# Patient Record
Sex: MALE | Race: WHITE
[De-identification: names, ages, dates, MRNs, and addresses within clinical notes are randomized per-mention and may not be internally consistent; named-entity substitution may affect disease eponyms.]

---

## 2017-10-04 ENCOUNTER — HOSPITAL ENCOUNTER (OUTPATIENT)
Dept: HOSPITAL 39 - LAB.O | Age: 72
Discharge: HOME | End: 2017-10-04
Attending: NURSE PRACTITIONER
Payer: COMMERCIAL

## 2017-10-04 DIAGNOSIS — D63.8: Primary | ICD-10-CM

## 2018-02-02 ENCOUNTER — HOSPITAL ENCOUNTER (OUTPATIENT)
Dept: HOSPITAL 39 - LAB.O | Age: 73
End: 2018-02-02
Attending: NURSE PRACTITIONER
Payer: COMMERCIAL

## 2018-02-02 DIAGNOSIS — N30.00: ICD-10-CM

## 2018-02-02 DIAGNOSIS — R40.4: Primary | ICD-10-CM

## 2018-02-28 ENCOUNTER — HOSPITAL ENCOUNTER (OUTPATIENT)
Dept: HOSPITAL 39 - LAB.O | Age: 73
End: 2018-02-28
Attending: NURSE PRACTITIONER
Payer: COMMERCIAL

## 2018-02-28 DIAGNOSIS — I50.40: Primary | ICD-10-CM

## 2018-02-28 NOTE — RAD
EXAM DESCRIPTION: Chest 2 views



CLINICAL HISTORY: UNSPECIFIED COMBINED SYSTOLIC AND DIASTOLIC

HEART FAILURE



COMPARISON: None



TECHNIQUE: PA and lateral views of the chest



FINDINGS: Lungs are suboptimally aerated bilaterally. No

consolidation nor pneumothorax nor pleural effusion in either

lung. Cardiomediastinal contours are unremarkable in appearance.

Thoracic bony structures grossly intact. Patient underwent prior

median sternotomy.



IMPRESSION: No acute cardiopulmonary process.



Electronically signed by:  Artemio Fonseca MD  2/28/2018 1:10 PM CST

Workstation: [id number]

## 2018-03-01 ENCOUNTER — HOSPITAL ENCOUNTER (OUTPATIENT)
Dept: HOSPITAL 39 - LAB.O | Age: 73
End: 2018-03-01
Attending: NURSE PRACTITIONER
Payer: COMMERCIAL

## 2018-03-01 DIAGNOSIS — I50.40: Primary | ICD-10-CM

## 2018-03-05 ENCOUNTER — HOSPITAL ENCOUNTER (OUTPATIENT)
Dept: HOSPITAL 39 - LAB.O | Age: 73
End: 2018-03-05
Attending: NURSE PRACTITIONER
Payer: COMMERCIAL

## 2018-03-05 DIAGNOSIS — I50.40: ICD-10-CM

## 2018-03-05 DIAGNOSIS — K75.9: Primary | ICD-10-CM
